# Patient Record
Sex: MALE | Race: WHITE | Employment: FULL TIME | URBAN - METROPOLITAN AREA
[De-identification: names, ages, dates, MRNs, and addresses within clinical notes are randomized per-mention and may not be internally consistent; named-entity substitution may affect disease eponyms.]

---

## 2017-09-27 PROBLEM — L91.8 SKIN TAG: Status: ACTIVE | Noted: 2017-09-27

## 2017-09-27 PROBLEM — L71.9 ROSACEA: Status: ACTIVE | Noted: 2017-09-27

## 2017-09-27 PROBLEM — L81.7 PIGMENTED PURPURIC DERMATOSIS: Status: ACTIVE | Noted: 2017-09-27

## 2020-10-29 ENCOUNTER — HOSPITAL ENCOUNTER (OUTPATIENT)
Age: 50
Discharge: HOME | End: 2020-10-29
Attending: FAMILY MEDICINE
Payer: COMMERCIAL

## 2020-10-29 VITALS
RESPIRATION RATE: 20 BRPM | TEMPERATURE: 98 F | HEART RATE: 88 BPM | DIASTOLIC BLOOD PRESSURE: 94 MMHG | OXYGEN SATURATION: 96 % | SYSTOLIC BLOOD PRESSURE: 175 MMHG

## 2020-10-29 DIAGNOSIS — Z20.822 ENCOUNTER FOR LABORATORY TESTING FOR COVID-19 VIRUS: ICD-10-CM

## 2020-10-29 DIAGNOSIS — Z20.822 CLOSE EXPOSURE TO 2019 NOVEL CORONAVIRUS: ICD-10-CM

## 2020-10-29 DIAGNOSIS — F17.200 SMOKER: ICD-10-CM

## 2020-10-29 DIAGNOSIS — R03.0 ELEVATED BLOOD PRESSURE READING: ICD-10-CM

## 2020-10-29 DIAGNOSIS — R51.9 HEADACHE: Primary | ICD-10-CM

## 2020-10-29 PROCEDURE — U0003 INFECTIOUS AGENT DETECTION BY NUCLEIC ACID (DNA OR RNA); SEVERE ACUTE RESPIRATORY SYNDROME CORONAVIRUS 2 (SARS-COV-2) (CORONAVIRUS DISEASE [COVID-19]), AMPLIFIED PROBE TECHNIQUE, MAKING USE OF HIGH THROUGHPUT TECHNOLOGIES AS DESCRIBED BY CMS-2020-01-R: HCPCS | Performed by: FAMILY MEDICINE

## 2020-10-29 PROCEDURE — 99202 OFFICE O/P NEW SF 15 MIN: CPT | Performed by: FAMILY MEDICINE

## 2020-10-29 RX ORDER — AMLODIPINE BESYLATE 5 MG/1
5 TABLET ORAL DAILY
COMMUNITY
Start: 2020-09-29

## 2020-10-29 NOTE — ED PROVIDER NOTES
Patient Seen in: [facility]      History   Patient presents with:  Headache    Stated Complaint: ha/body aches/exposed    HPI    Pt is a 52 yo with exposure to covid at work. Now has a ha and bodyaches. No fevers. Has a mild cough also.     No takes less than 2 seconds. Neurological:      General: No focal deficit present. Mental Status: He is alert and oriented to person, place, and time.    Psychiatric:         Mood and Affect: Mood normal.         Behavior: Behavior normal.

## 2022-05-07 ENCOUNTER — HOSPITAL ENCOUNTER (OUTPATIENT)
Age: 52
Discharge: HOME | End: 2022-05-07
Attending: EMERGENCY MEDICINE
Payer: COMMERCIAL

## 2022-05-07 VITALS
HEIGHT: 74 IN | RESPIRATION RATE: 18 BRPM | HEART RATE: 100 BPM | SYSTOLIC BLOOD PRESSURE: 123 MMHG | OXYGEN SATURATION: 97 % | WEIGHT: 315 LBS | BODY MASS INDEX: 40.43 KG/M2 | TEMPERATURE: 99 F | DIASTOLIC BLOOD PRESSURE: 56 MMHG

## 2022-05-07 DIAGNOSIS — Z20.822 ENCOUNTER FOR LABORATORY TESTING FOR COVID-19 VIRUS: Primary | ICD-10-CM

## 2022-05-07 DIAGNOSIS — U07.1 COVID-19: ICD-10-CM

## 2022-05-07 LAB — SARS-COV-2 RNA RESP QL NAA+PROBE: DETECTED

## 2022-05-07 RX ORDER — BEBTELOVIMAB 87.5 MG/ML
175 INJECTION, SOLUTION INTRAVENOUS ONCE
Status: COMPLETED | OUTPATIENT
Start: 2022-05-07 | End: 2022-05-07

## 2024-11-18 ENCOUNTER — ORDER TRANSCRIPTION (OUTPATIENT)
Dept: ADMINISTRATIVE | Facility: HOSPITAL | Age: 54
End: 2024-11-18

## 2024-11-18 DIAGNOSIS — Z13.6 SCREENING FOR CARDIOVASCULAR CONDITION: Primary | ICD-10-CM

## 2024-11-29 ENCOUNTER — HOSPITAL ENCOUNTER (OUTPATIENT)
Dept: CT IMAGING | Age: 54
Discharge: HOME | End: 2024-11-29
Attending: INTERNAL MEDICINE

## 2024-11-29 DIAGNOSIS — Z13.6 SCREENING FOR CARDIOVASCULAR CONDITION: ICD-10-CM

## 2025-06-30 ENCOUNTER — APPOINTMENT (OUTPATIENT)
Dept: GENERAL RADIOLOGY | Age: 55
End: 2025-06-30
Attending: PHYSICIAN ASSISTANT
Payer: COMMERCIAL

## 2025-06-30 ENCOUNTER — HOSPITAL ENCOUNTER (OUTPATIENT)
Age: 55
Discharge: HOME | End: 2025-06-30
Payer: COMMERCIAL

## 2025-06-30 VITALS
DIASTOLIC BLOOD PRESSURE: 88 MMHG | HEART RATE: 90 BPM | OXYGEN SATURATION: 94 % | RESPIRATION RATE: 16 BRPM | TEMPERATURE: 98 F | SYSTOLIC BLOOD PRESSURE: 160 MMHG

## 2025-06-30 DIAGNOSIS — J18.9 PNEUMONIA OF LEFT LOWER LOBE DUE TO INFECTIOUS ORGANISM: Primary | ICD-10-CM

## 2025-06-30 DIAGNOSIS — R05.1 ACUTE COUGH: ICD-10-CM

## 2025-06-30 DIAGNOSIS — E11.65 TYPE 2 DIABETES MELLITUS WITH HYPERGLYCEMIA, WITHOUT LONG-TERM CURRENT USE OF INSULIN (HCC): ICD-10-CM

## 2025-06-30 DIAGNOSIS — H61.22 IMPACTED CERUMEN OF LEFT EAR: ICD-10-CM

## 2025-06-30 LAB — GLUCOSE BLDC GLUCOMTR-MCNC: 178 MG/DL (ref 70–99)

## 2025-06-30 PROCEDURE — 82962 GLUCOSE BLOOD TEST: CPT | Performed by: PHYSICIAN ASSISTANT

## 2025-06-30 PROCEDURE — 71046 X-RAY EXAM CHEST 2 VIEWS: CPT | Performed by: PHYSICIAN ASSISTANT

## 2025-06-30 PROCEDURE — 99203 OFFICE O/P NEW LOW 30 MIN: CPT | Performed by: PHYSICIAN ASSISTANT

## 2025-06-30 RX ORDER — LORATADINE 10 MG
1 CAPSULE ORAL 4 TIMES DAILY PRN
Qty: 30 CAPSULE | Refills: 0 | Status: SHIPPED | OUTPATIENT
Start: 2025-06-30

## 2025-06-30 RX ORDER — BENZONATATE 200 MG/1
200 CAPSULE ORAL 3 TIMES DAILY PRN
Qty: 30 CAPSULE | Refills: 0 | Status: SHIPPED | OUTPATIENT
Start: 2025-06-30

## 2025-06-30 RX ORDER — DOXYCYCLINE 100 MG/1
100 CAPSULE ORAL 2 TIMES DAILY
Qty: 14 CAPSULE | Refills: 0 | Status: SHIPPED | OUTPATIENT
Start: 2025-06-30

## 2025-06-30 RX ORDER — ALBUTEROL SULFATE 90 UG/1
2 INHALANT RESPIRATORY (INHALATION) EVERY 4 HOURS PRN
Qty: 1 EACH | Refills: 0 | Status: SHIPPED | OUTPATIENT
Start: 2025-06-30 | End: 2025-07-30

## 2025-06-30 NOTE — ED PROVIDER NOTES
Patient Seen in: [facility]        History  Chief Complaint   Patient presents with    Cough/URI     Stated Complaint: Shortness of breath    Subjective:   HPI            Patient is 55-year-old male with obesity, hypertension, hyperlipidemia, type 2 diabetes (not on medication), smoker, accompanied by wife, presenting to immediate care for evaluation of cold symptoms for the last week.  Associated; nasal congestion, runny nose, postnasal drip, left ear clogging sensation, and cough.  Last several days has had chest congestion with associated shortness of breath at nighttime and with laying down.  Has not taken anything for symptoms.  No fevers.  No chest pain.  No current shortness of breath.  No cardiopulmonary history.  Current smoker.  Not immunocompromise.      Objective:     Past Medical History:    Diabetes (HCC)    Essential hypertension              Past Surgical History:   Procedure Laterality Date    Removal gallbladder                  Social History     Socioeconomic History    Marital status:    Tobacco Use    Smoking status: Every Day    Smokeless tobacco: Never   Vaping Use    Vaping status: Never Used   Substance and Sexual Activity    Alcohol use: Never    Drug use: Never     Social Drivers of Health     Food Insecurity: No Food Insecurity (2/27/2023)    Received from [facility]    Hunger Vital Sign     Worried About Running Out of Food in the Last Year: Never true     Ran Out of Food in the Last Year: Never true   Transportation Needs: No Transportation Needs (2/27/2023)    Received from [facility]    PRAPARE - Transportation     Lack of Transportation (Medical): No     Lack of Transportation (Non-Medical): No              Review of Systems   Constitutional:  Negative for chills and fever.   HENT:  Positive for congestion, hearing loss and postnasal drip. Negative for ear discharge and ear pain.    Respiratory:  Positive for cough and  shortness of breath.    Cardiovascular:  Negative for chest pain.   Gastrointestinal:  Negative for abdominal pain, nausea and vomiting.   Musculoskeletal:  Negative for neck pain and neck stiffness.   Skin:  Negative for rash.   Allergic/Immunologic: Negative for immunocompromised state.   Neurological:  Negative for dizziness, weakness, light-headedness and headaches.   Psychiatric/Behavioral:  Negative for confusion.        Positive for stated complaint: Shortness of breath  Other systems are as noted in HPI.  Constitutional and vital signs reviewed.      All other systems reviewed and negative except as noted above.                  Physical Exam    ED Triage Vitals [06/30/25 1053]   /88   Pulse 90   Resp 16   Temp 97.9 °F (36.6 °C)   Temp src Oral   SpO2 94 %   O2 Device None (Room air)       Current Vitals:   Vital Signs  BP: 160/88  Pulse: 90  Resp: 16  Temp: 97.9 °F (36.6 °C)  Temp src: Oral    Oxygen Therapy  SpO2: 94 %  O2 Device: None (Room air)            Physical Exam  Vitals and nursing note reviewed.   Constitutional:       General: He is not in acute distress.     Appearance: Normal appearance. He is obese. He is not ill-appearing, toxic-appearing or diaphoretic.   HENT:      Head: Normocephalic and atraumatic.      Right Ear: Tympanic membrane normal.      Ears:      Comments: Cerumen impaction.  Unable to visualize TM.  Ear canal without swelling or redness or drainage.  Nontender external ear, tragus, mastoid bilaterally     Nose: Congestion present.      Mouth/Throat:      Mouth: Mucous membranes are moist.      Pharynx: No posterior oropharyngeal erythema.      Comments: Postnasal drip  Eyes:      Conjunctiva/sclera: Conjunctivae normal.   Neck:      Comments: No JVD  Cardiovascular:      Rate and Rhythm: Normal rate and regular rhythm.      Pulses: Normal pulses.      Comments: Regular rate and rhythm.  Pulmonary:      Effort: Pulmonary effort is normal. No respiratory distress.       Comments: Diminished without rales or wheezing.  Occasionally coughing-nonproductive.  No conversational dyspnea no retractions no increased work of breathing  Musculoskeletal:         General: No swelling or deformity. Normal range of motion.      Cervical back: Normal range of motion. No rigidity.      Comments: No lower extremity edema   Neurological:      General: No focal deficit present.      Mental Status: He is alert and oriented to person, place, and time.      Motor: No weakness.      Coordination: Coordination normal.      Gait: Gait normal.   Psychiatric:         Mood and Affect: Mood normal.         Behavior: Behavior normal.             ED Course  Labs Reviewed   POCT GLUCOSE - Abnormal; Notable for the following components:       Result Value    POC Glucose  178 (*)     All other components within normal limits     Glucose point-of-care 178, elevated.  Patient is type II diabetic.  No clinical signs of DKA.  Not on medication.  Recommend PCP follow-up for management of your diabetes    XR CHEST PA + LAT CHEST (CPT=71046)   Final Result   PROCEDURE: XR CHEST PA + LAT CHEST (CPT=71046)      INDICATIONS: Morbid obesity, acute cough, and shortness of breath.      COMPARISON: None available.      TECHNIQUE: Frontal and lateral chest radiographs were obtained.      FINDINGS:   CARDIAC/VASC: The cardiomediastinal silhouette is not enlarged. There is    mild tortuosity of the thoracic aorta with peripheral atherosclerotic    vascular calcification. The pulmonary vascularity is within normal limits.      MEDIAST/OC: No visible mass or adenopathy.      LUNGS/PLEURA: Patchy retrocardiac airspace consolidation is apparent. No    large pleural effusion or pneumothorax is evident.      BONES: Mild degenerative changes of the thoracic spine are apparent. There    is no fracture or visible bony lesion.         =====   CONCLUSION:   Retrocardiac airspace disease, concerning for potential left lower lobe     pneumonia. Follow-up is recommended to document resolution.       Electronically Verified and Signed by Attending Radiologist: Jordy Mendoza MD 6/30/2025 11:41 AM   Workstation: [id number]           [facility]    Differential diagnoses considered included, but are not exclusive of: URI, influenza, COVID, otitis, sinusitis, pharyngitis, bronchitis, pneumonia, pleural effusion, hypervolemia etc.    Dx: Pneumonia of  Left Lower  Lung, Initial Encounter  Dx: Acute Cough  Onset: 1 week cold symptoms  No severe disease  No hypoxia  Not requiring hospitalization  Not immunocompromise  No chest pain or shortness of breath  Overall well-appearing  Hemodynamically stable  Afebrile  Tolerating PO  Outpatient management  Supportive care  Rest  Oral hydration  Motrin/Tylenol as needed for pain/fever/myalgias  Tessalon and Coricidin for cough  Rx Doxycycline 7-day course oral antibiotics for community-acquired pneumonia  PCP follow  ED Return precaution  Discharge instructions pneumonia      Dx: Left Cerumen Impaction, Initial Encounter  Cerumen irrigation by [facility] tech  Re-evaluation cerumen impaction resolved, no ear infection    Medical Decision Making      Disposition and Plan     Clinical Impression:  1. Pneumonia of left lower lobe due to infectious organism    2. Acute cough    3. Impacted cerumen of left ear    4. Type 2 diabetes mellitus with hyperglycemia, without long-term current use of insulin ([facility])         Disposition:  Discharge  6/30/2025 11:57 am    Follow-up:  Garett Berry MD  [address] [place] [address] [id number] [phone]                Medications Prescribed:  Current Discharge Medication List        START taking these medications    Details   doxycycline 100 MG Oral Cap Take 1 capsule (100 mg total) by mouth 2 (two) times daily.  Qty: 14 capsule, Refills: 0      benzonatate 200 MG Oral Cap Take 1 capsule (200 mg total) by mouth 3 (three) times daily as needed for cough.  Qty: 30 capsule,  Refills: 0      Dextromethorphan-guaiFENesin (CORICIDIN HBP CONGESTION/COUGH)  MG Oral Cap Take 1 capsule by mouth 4 (four) times daily as needed (cough, cold symptoms).  Qty: 30 capsule, Refills: 0                   Supplementary Documentation:

## 2025-06-30 NOTE — ED INITIAL ASSESSMENT (HPI)
Pt with cough, runny nose, post nasal drip, L ear clogged x1 week and shortness of breath since last night. Pt denied fevers.

## (undated) NOTE — LETTER
Date & Time: 7/2/2025, 11:48 AM  Patient: Seamus Crowe  Encounter Provider(s):    Norris Obando PA       To Whom It May Concern:    Seamus Crowe was seen and treated in our department on 6/30/2025. He cannot travel, which will require a change in his itinerary.    Dx: Pneumonia    If you have any questions or concerns, please do not hesitate to call.      D Topher SAMS  _____________________________  Physician/APC Signature